# Patient Record
Sex: MALE | URBAN - METROPOLITAN AREA
[De-identification: names, ages, dates, MRNs, and addresses within clinical notes are randomized per-mention and may not be internally consistent; named-entity substitution may affect disease eponyms.]

---

## 2023-06-29 ENCOUNTER — OFFICE VISIT (OUTPATIENT)
Facility: LOCATION | Age: 83
End: 2023-06-29
Payer: COMMERCIAL

## 2023-06-29 DIAGNOSIS — H26.493 OTHER SECONDARY CATARACT, BILATERAL: Primary | ICD-10-CM

## 2023-06-29 PROCEDURE — 99204 OFFICE O/P NEW MOD 45 MIN: CPT | Performed by: OPHTHALMOLOGY

## 2023-06-29 ASSESSMENT — INTRAOCULAR PRESSURE
OS: 12
OD: 12

## 2023-06-29 ASSESSMENT — VISUAL ACUITY
OS: 20/150
OD: 20/30

## 2023-06-29 NOTE — IMPRESSION/PLAN
Impression: Other secondary cataract, bilateral: H26.493. OS>OD Plan: Educated patient on PCO being the cloudiness of the artificial lens that was placed during cataract surgery. Explained to patient the secondary cataract is limiting the vision. Discussed that opacity is the cause of the decreased vision. Discussed the risks and benefits of performing a YAG Capsulotomy including the risk of floaters, retinal detachment, and retinal swelling. Patient understands to expect floaters after the YAG capsulotomy procedures and understands that they may remain indefinitely. Recommend that patient undergo a YAG Capsulotomy OD/OS then OD/OS. RTC in 2 weeks for YAG PC OS with Dr. Lyman.

## 2023-07-06 ENCOUNTER — PROCEDURE (OUTPATIENT)
Facility: LOCATION | Age: 83
End: 2023-07-06
Payer: COMMERCIAL

## 2023-07-06 DIAGNOSIS — H26.492 OTHER SECONDARY CATARACT, LEFT EYE: Primary | ICD-10-CM

## 2023-07-06 PROCEDURE — 66821 AFTER CATARACT LASER SURGERY: CPT | Performed by: OPHTHALMOLOGY

## 2023-07-06 ASSESSMENT — INTRAOCULAR PRESSURE
OS: 12
OD: 12

## 2023-07-06 NOTE — IMPRESSION/PLAN
Impression: Other secondary cataract, left eye: H26.492. YAG PC OS done today . Patient was consented and tolerated procedure well without complication.  Plan: RTC in 2 weeks for post op with Dr. Yoan Trujillo

## 2023-07-20 ENCOUNTER — POST-OPERATIVE VISIT (OUTPATIENT)
Facility: LOCATION | Age: 83
End: 2023-07-20
Payer: COMMERCIAL

## 2023-07-20 DIAGNOSIS — Z48.810 ENCOUNTER FOR SURGICAL AFTERCARE FOLLOWING SURGERY ON A SENSE ORGAN: Primary | ICD-10-CM

## 2023-07-20 PROCEDURE — 99024 POSTOP FOLLOW-UP VISIT: CPT | Performed by: OPHTHALMOLOGY

## 2023-07-20 ASSESSMENT — INTRAOCULAR PRESSURE
OD: 12
OS: 12

## 2023-07-20 ASSESSMENT — VISUAL ACUITY: OS: 20/30

## 2023-07-20 NOTE — IMPRESSION/PLAN
Impression: S/P YAG PC OS - 14 Days. Encounter for surgical aftercare following surgery on a sense organ  Z48.810. Patient stable Plan: RTC in 1 year for reevaluation with Dr. Lyman.

## 2025-05-05 ENCOUNTER — OFFICE VISIT (OUTPATIENT)
Facility: LOCATION | Age: 85
End: 2025-05-05
Payer: MEDICARE

## 2025-05-05 DIAGNOSIS — H40.1132 PRIMARY OPEN-ANGLE GLAUCOMA, BILATERAL, MODERATE STAGE: ICD-10-CM

## 2025-05-05 DIAGNOSIS — H40.023 OPEN ANGLE WITH BORDERLINE FINDINGS, HIGH RISK, BILATERAL: Primary | ICD-10-CM

## 2025-05-05 DIAGNOSIS — H04.123 DRY EYE SYNDROME OF BILATERAL LACRIMAL GLANDS: ICD-10-CM

## 2025-05-05 DIAGNOSIS — H16.223 KERATOCONJUNCT SICCA, NOT SPECIFIED AS SJOGREN'S, BILATERAL: ICD-10-CM

## 2025-05-05 PROCEDURE — 92133 CPTRZD OPH DX IMG PST SGM ON: CPT | Performed by: STUDENT IN AN ORGANIZED HEALTH CARE EDUCATION/TRAINING PROGRAM

## 2025-05-05 PROCEDURE — 99213 OFFICE O/P EST LOW 20 MIN: CPT | Performed by: STUDENT IN AN ORGANIZED HEALTH CARE EDUCATION/TRAINING PROGRAM

## 2025-05-05 PROCEDURE — 92134 CPTRZ OPH DX IMG PST SGM RTA: CPT | Performed by: STUDENT IN AN ORGANIZED HEALTH CARE EDUCATION/TRAINING PROGRAM

## 2025-05-05 PROCEDURE — 76514 ECHO EXAM OF EYE THICKNESS: CPT | Performed by: STUDENT IN AN ORGANIZED HEALTH CARE EDUCATION/TRAINING PROGRAM

## 2025-05-05 ASSESSMENT — INTRAOCULAR PRESSURE
OS: 14
OD: 14